# Patient Record
Sex: FEMALE | Race: WHITE | NOT HISPANIC OR LATINO | Employment: FULL TIME | URBAN - METROPOLITAN AREA
[De-identification: names, ages, dates, MRNs, and addresses within clinical notes are randomized per-mention and may not be internally consistent; named-entity substitution may affect disease eponyms.]

---

## 2017-04-05 ENCOUNTER — OFFICE VISIT (OUTPATIENT)
Dept: RETAIL CLINIC | Facility: CLINIC | Age: 22
End: 2017-04-05

## 2017-04-05 DIAGNOSIS — Z11.1 VISIT FOR TB SKIN TEST: Primary | ICD-10-CM

## 2017-04-05 PROCEDURE — 86580 TB INTRADERMAL TEST: CPT | Performed by: NURSE PRACTITIONER

## 2017-04-05 NOTE — PROGRESS NOTES
CC:Presents for Tb screening.     S: Has never had a positive test for Tb or been infected with Tb.  Denies symptoms of active Tb and risk factors for acquiring latent or active Tb:  Has not had a cough> 3 weeks, hemoptysis, unexplained fever, unexplained weight loss, fatigue, night sweats, or change in appetite.  s not a high risk contact of person known or suspected of having Tb.  Has not been to another country for 3 or more months where Tb is common.  Has been in the [place] for > 5 years  Is not a resident or employee of high Tb risk congregate setting.  Is not a health care worker who serves high-risk patients.  Is not medically underserved.  Has not been homeless in past 2 years.  Does not inject illicit drugs or use crack cocaine.  Is not HIV positive, or considered at risk for HIV if status is unknown.   Is not imunosuppressed or on immunosuppressive therapy.  Is not malnourished or >10% below ideal body weight.    O: Appears well today. Respirations are even & unlabored. Lungs are CTA bilaterally.     A: 2 step PPD initiated today per protocol.     P: See scanned copy. RTC in 49-72 hr. For reading. Repeat PPD in 1-3 weeks.    GEOFF Quezada

## 2017-04-09 ENCOUNTER — OFFICE VISIT (OUTPATIENT)
Dept: RETAIL CLINIC | Facility: CLINIC | Age: 22
End: 2017-04-09

## 2017-04-09 DIAGNOSIS — Z11.1 VISIT FOR TB SKIN TEST: Primary | ICD-10-CM

## 2017-04-09 PROCEDURE — 86580 TB INTRADERMAL TEST: CPT | Performed by: NURSE PRACTITIONER

## 2017-04-09 NOTE — PROGRESS NOTES
Patient presents to clinic with request for a TB skin test.  Denies any previous positive test.  Denies any current steroid use.

## 2017-04-11 ENCOUNTER — TELEPHONE (OUTPATIENT)
Dept: RETAIL CLINIC | Facility: CLINIC | Age: 22
End: 2017-04-11

## 2017-04-11 NOTE — TELEPHONE ENCOUNTER
Patient presented to have TB skin test read.  No TB skin test order found for 4/9/2017.  Test was negative.  See scanned forms.

## 2017-04-17 ENCOUNTER — OFFICE VISIT (OUTPATIENT)
Dept: RETAIL CLINIC | Facility: CLINIC | Age: 22
End: 2017-04-17

## 2017-04-17 DIAGNOSIS — Z11.1 VISIT FOR TB SKIN TEST: Primary | ICD-10-CM

## 2017-04-17 LAB
INDURATION: NORMAL MM (ref 0–10)
TB SKIN TEST: NORMAL

## 2017-04-17 PROCEDURE — 86580 TB INTRADERMAL TEST: CPT | Performed by: NURSE PRACTITIONER

## 2022-11-29 ENCOUNTER — TELEPHONE (OUTPATIENT)
Dept: GENETICS | Facility: HOSPITAL | Age: 27
End: 2022-11-29

## 2022-11-29 NOTE — TELEPHONE ENCOUNTER
Left message reminding patient of genetic appt on Friday. That appt is by phone.  Asked patient to call me back to review her family history prior to that appointment.

## 2022-12-02 ENCOUNTER — CLINICAL SUPPORT (OUTPATIENT)
Dept: GENETICS | Facility: HOSPITAL | Age: 27
End: 2022-12-02

## 2022-12-02 DIAGNOSIS — Z80.41 FAMILY HISTORY OF OVARIAN CANCER: ICD-10-CM

## 2022-12-02 DIAGNOSIS — Z13.79 GENETIC TESTING: Primary | ICD-10-CM

## 2022-12-02 NOTE — PROGRESS NOTES
Sherlyn Cardona, a 27-year-old female, was referred for genetic counseling due to a family history of breast and ovarian cancer. Genetic counseling was performed via telephone. Ms. Cardona confirmed her full name, date of birth, and that she was physically located in the [facility] at the time of the appointment. Ms. Cardona has no personal history of cancer. 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 She is pre-menopausal and retains her uterus and ovaries.  Ms. Cardona has not yet started mammograms and colonoscopies. She was interested in discussing her risk for a hereditary cancer syndrome. Ms. Cardona decided to pursue comprehensive genetic testing to evaluate her risk of cancer, therefore the CancerNext-Expanded Panel was ordered through [employer] which analyzes 77 genes associated with an increased cancer risk. A saliva kit will be sent to her home for sample collection. Results are expected 2-3 after Lance receives her sample.      PERTINENT FAMILY HISTORY: (See attached pedigree)   Sister:       Neuroblastoma, 2  Pat. Grandmother:     Ovarian cancer, 40  Pat. Great-Grandmother:    Breast cancer, 70s  Mat. Great-Grandfather:    Melanoma, 80  Mat. Great-Grandmother:    Breast cancer    We do not have medical records regarding any of these diagnoses.     RISK ASSESSMENT:  Ms. Cardona’s family history of breast cancer raises the question of a hereditary cancer syndrome.  NCCN guidelines for genetic testing for BRCA1/2 states that individuals with a close relative diagnosed with ovarian cancer at any age may consider genetic testing. Based on Ms. Cardona’s paternal grandmother’s diagnosis of ovarian cancer, she would clearly meet this criteria. This risk assessment is based on the family history information provided at the time of the appointment.  The assessment could change in the future should new information be obtained.    GENETIC COUNSELING (30 minutes):  We reviewed the family history  information in detail. Cases of cancer follow three general patterns: sporadic, familial, and hereditary.  While most cancer is sporadic, some cases appear to occur in family clusters.  These cases are said to be familial and account for 10-20% of cancer cases.  Familial cases may be due to a combination of shared genes and environmental factors among family members.  In even fewer cases, the risk for cancer is inherited, and the genes responsible for the increased cancer risk are known.       Family histories typical of hereditary cancer syndromes usually include multiple first- and second-degree relatives diagnosed with cancer types that define a syndrome.  These cases tend to be diagnosed at younger-than-expected ages and can be bilateral or multifocal.  The cancer in these families follows an autosomal dominant inheritance pattern, which indicates the likely presence of a mutation in a cancer susceptibility gene.  Children and siblings of an individual believed to carry this mutation have a 50% chance of inheriting that mutation, thereby inheriting the increased risk to develop cancer.  These mutations can be passed down from the maternal or the paternal lineage.     Due to Ms. Cardona’s family history of breast cancer, we discussed hereditary breast cancer. Hereditary breast cancer accounts for 5-10% of all cases of breast cancer.  A significant proportion of hereditary breast cancer can be attributed to mutations in the BRCA1 and BRCA2 genes.  Mutations in these genes confer an increased risk for breast cancer, ovarian cancer, male breast cancer, prostate cancer, and pancreatic cancer.  Women with a BRCA1 or BRCA2 mutation have up to an 87% lifetime risk of breast cancer and up to a 44% risk of ovarian cancer.  There are other clinically significant breast cancer related genes in addition to BRCA1/2.      There are other genes that are known to be associated with an increased risk for cancer.  Some of these genes  have well defined cancer risks and established management guidelines.  Other genes that can be tested for have been more recently described, and there may be less data regarding the risks and therefore may not have established management guidelines. We discussed these limitations at length.  Based on Ms. Cardona’s desire to get as much information as possible regarding her personal risks and potential risks for her family, she opted to pursue testing through a panel that would look at several other genes known to increase the risk for cancer.    GENETIC TESTING:  The risks, benefits, and limitations of genetic testing and implications for clinical management following testing were reviewed.  DNA test results can influence decisions regarding screening and prevention.  Genetic testing can have significant psychological implications for both individuals and families. Also discussed was the possibility of employment and insurance discrimination based on genetic test results and the laws in place to prevent this, as well as the limitations of these laws.       We discussed panel testing, which would involve testing 77 genes associated with increased cancer risk. The implications of a positive or negative test result were discussed.  We also discussed the importance of testing an affected relative and how a negative result for Ms. Cardona wouldn’t necessarily mean the cancers presenting in her family weren’t due to a genetic mutation that Ms. Cardona did not inherit.  In general, a negative genetic test result is most informative if a mutation has first been established in an affected member of the family.  In cases where an affected individual is not available or interested in testing, it is appropriate to offer testing to an unaffected individual.     We discussed the possibility that, in some cases, genetic test results may be ambiguous due to the identification of a genetic variant of uncertain significance (VUS). These  variants may or may not be associated with an increased cancer risk. With multigene panel testing, it is not uncommon for a VUS to be identified.  If a VUS is identified, testing family members is typically not recommended and screening recommendations are made based on the family history.  The laboratories that perform genetic testing work to reclassify the VUS and send out an amended report if and when a VUS is reclassified.  The majority of variant findings are ultimately reclassified to a negative result. Given Ms. Cardona’s family history, a negative test result does not eliminate all cancer risk, although the risk may not be as high as it would with positive genetic testing.     PLAN: Genetic testing was ordered via the CancerNext-Expanded Panel through [employer]. Results are expected in 2-3 weeks. If she has any questions in the meantime, she is welcome to call me at [phone].      Sakina Cisneros, MS, [facility], [facility]  Licensed Certified Genetic Counselor

## 2024-05-17 ENCOUNTER — TRANSCRIBE ORDERS (OUTPATIENT)
Dept: ADMINISTRATIVE | Facility: HOSPITAL | Age: 29
End: 2024-05-17
Payer: COMMERCIAL

## 2024-05-17 DIAGNOSIS — N63.21 MASS OF UPPER OUTER QUADRANT OF LEFT BREAST: Primary | ICD-10-CM

## 2024-06-17 ENCOUNTER — HOSPITAL ENCOUNTER (OUTPATIENT)
Facility: HOSPITAL | Age: 29
Discharge: HOME | End: 2024-06-17
Payer: COMMERCIAL

## 2024-06-17 DIAGNOSIS — N63.21 MASS OF UPPER OUTER QUADRANT OF LEFT BREAST: ICD-10-CM

## 2024-06-17 PROCEDURE — 77062 BREAST TOMOSYNTHESIS BI: CPT | Performed by: RADIOLOGY

## 2024-06-17 PROCEDURE — 76642 ULTRASOUND BREAST LIMITED: CPT | Performed by: RADIOLOGY

## 2024-06-17 PROCEDURE — 76642 ULTRASOUND BREAST LIMITED: CPT

## 2024-06-17 PROCEDURE — 77066 DX MAMMO INCL CAD BI: CPT | Performed by: RADIOLOGY

## 2024-06-17 PROCEDURE — G0279 TOMOSYNTHESIS, MAMMO: HCPCS

## 2024-06-17 PROCEDURE — 77066 DX MAMMO INCL CAD BI: CPT
